# Patient Record
Sex: MALE | Race: WHITE | URBAN - METROPOLITAN AREA
[De-identification: names, ages, dates, MRNs, and addresses within clinical notes are randomized per-mention and may not be internally consistent; named-entity substitution may affect disease eponyms.]

---

## 2018-03-21 ENCOUNTER — HISTORICAL (OUTPATIENT)
Dept: LAB | Facility: HOSPITAL | Age: 78
End: 2018-03-21

## 2018-03-21 LAB
BUN SERPL-MCNC: 17 MG/DL (ref 7–18)
CALCIUM SERPL-MCNC: 9.2 MG/DL (ref 8.5–10.1)
CHLORIDE SERPL-SCNC: 100 MMOL/L (ref 98–107)
CO2 SERPL-SCNC: 28 MMOL/L (ref 21–32)
CREAT SERPL-MCNC: 1.51 MG/DL (ref 0.7–1.3)
CREAT/UREA NIT SERPL: 11.3
GLUCOSE SERPL-MCNC: 101 MG/DL (ref 74–106)
POTASSIUM SERPL-SCNC: 5.3 MMOL/L (ref 3.5–5.1)
PSA SERPL-MCNC: 0.06 NG/ML (ref 0–4)
SODIUM SERPL-SCNC: 132 MMOL/L (ref 136–145)

## 2019-03-19 ENCOUNTER — HISTORICAL (OUTPATIENT)
Dept: ADMINISTRATIVE | Facility: HOSPITAL | Age: 79
End: 2019-03-19

## 2019-03-19 LAB
FINAL CULTURE: NORMAL
FINAL CULTURE: NORMAL
GRAM STN SPEC: NORMAL

## 2019-03-21 LAB — FINAL CULTURE: NORMAL

## 2021-10-17 ENCOUNTER — HISTORICAL (OUTPATIENT)
Dept: ADMINISTRATIVE | Facility: HOSPITAL | Age: 81
End: 2021-10-17

## 2021-10-20 LAB — FINAL CULTURE: NORMAL

## 2021-10-22 ENCOUNTER — HISTORICAL (OUTPATIENT)
Dept: ADMINISTRATIVE | Facility: HOSPITAL | Age: 81
End: 2021-10-22

## 2021-10-28 LAB
FINAL CULTURE: NORMAL
FINAL CULTURE: NORMAL

## 2023-05-16 ENCOUNTER — HOSPITAL ENCOUNTER (OUTPATIENT)
Dept: TELEMEDICINE | Facility: HOSPITAL | Age: 83
Discharge: HOME | End: 2023-05-16
Payer: MEDICARE

## 2023-05-16 DIAGNOSIS — I63.81 CEREBROVASCULAR ACCIDENT (CVA) DUE TO OCCLUSION OF SMALL ARTERY: ICD-10-CM

## 2023-05-16 PROBLEM — I63.9 STROKE: Status: ACTIVE | Noted: 2023-05-16

## 2023-05-16 PROCEDURE — G0427 PR INPT TELEHEALTH CON 70/>M: ICD-10-PCS | Mod: 95,,, | Performed by: PSYCHIATRY & NEUROLOGY

## 2023-05-16 PROCEDURE — G0427 INPT/ED TELECONSULT70: HCPCS | Mod: 95,,, | Performed by: PSYCHIATRY & NEUROLOGY

## 2023-05-17 NOTE — CONSULTS
[facility]  Vascular Neurology  Comprehensive Stroke Center  TeleVascular Neurology Acute Consultation Note      Consults    Consulting Provider: NATALEE FORRESTER  Current Providers  No providers found    Patient Location: [facility] Department  Spoke hospital nurse at bedside with patient assisting consultant.     Patient information was obtained from patient and spouse/SO.         Assessment/Plan:       Diagnoses:   Neuro  Stroke  Fluctuating facial droop and left sided weakness over one week.   Antithrombotics for secondary stroke prevention: Antiplatelets: Aspirin: 81 mg daily    Statins for secondary stroke prevention and hyperlipidemia, if present:   Statins: Atorvastatin- 80 mg daily    Aggressive risk factor modification: HTN     Rehab efforts: The patient has been evaluated by a stroke team provider and the therapy needs have been fully considered based off the presenting complaints and exam findings. The following therapy evaluations are needed: PT evaluate and treat, OT evaluate and treat, SLP evaluate and treat    Diagnostics ordered/pending: CTA Head to assess vasculature , CTA Neck/Arch to assess vasculature, HgbA1C to assess blood glucose levels, Lipid Profile to assess cholesterol levels, MRI head without contrast to assess brain parenchyma, TTE to assess cardiac function/status , TSH to assess thyroid function    VTE prophylaxis: Enoxaparin 40 mg SQ every 24 hours    BP parameters: Infarct: No intervention, SBP <220            STROKE DOCUMENTATION     Acute Stroke Times:   Acute Stroke Times   Last Known Normal Date: 05/16/23  Last Known Normal Time: 1730 (Fluctuating symptoms with persistent facial droop since 530pm. )  Unknown Normal Time: Unknown Time  Stroke Team Arrival Date: 05/16/23  Stroke Team Arrival Time: 2037  CT Interpretation Time: 2038    NIH Scale:  Interval: baseline  1a. Level of  Consciousness: 0-->Alert, keenly responsive  1b. LOC Questions: 1-->Answers one question correctly  1c. LOC Commands: 0-->Performs both tasks correctly  2. Best Gaze: 0-->Normal  3. Visual: 0-->No visual loss  4. Facial Palsy: 1-->Minor paralysis (flattened nasolabial fold, asymmetry on smiling)  5a. Motor Arm, Left: 1-->Drift, limb holds 90 (or 45) degrees, but drifts down before full 10 seconds, does not hit bed or other support  5b. Motor Arm, Right: 0-->No drift, limb holds 90 (or 45) degrees for full 10 secs  6a. Motor Leg, Left: 0-->No drift, leg holds 30 degree position for full 5 secs  6b. Motor Leg, Right: 0-->No drift, leg holds 30 degree position for full 5 secs  7. Limb Ataxia: 0-->Absent  8. Sensory: 0-->Normal, no sensory loss  9. Best Language: 0-->No aphasia, normal  10. Dysarthria: 0-->Normal  11. Extinction and Inattention (formerly Neglect): 0-->No abnormality  Total (NIH Stroke Scale): 3     Modified Wingate Score: 0  Nahun Coma Scale:14   ABCD2 Score:    [id number] Score:   HAS -BLED Score:   ICH Score:   Hunt & Riddle Classification:       There were no vitals taken for this visit.  Eligible for thrombolytic therapy?: No  Thrombolytic therapy recomended: Thrombolytic therapy not recommended due to Outside of treatment window   Possible Interventional Revascularization Candidate? No; at this time symptoms not suggestive of large vessel occlusion    Disposition Recommendation: admit to inpatient    Subjective:     History of Present Illness:  Sudden onset left facial droop, slurred speech and left UE numbness/weakness    LKN 530pm    PMHx  HTN  PVD      Woke up with symptoms?: no    Recent bleeding noted: no  Does the patient take any Blood Thinners? yes  Medications: Antiplatelets:  aspirin      Past Medical History: hypertension    Past Surgical History: no relevant surgical history    Family History: no relevant history    Social History: former smoker    Allergies:  PCN    Review of Systems    Neurological: Positive for facial asymmetry and weakness.   All other systems reviewed and are negative.    Objective:   Vitals:  BP: 196/61, Respiratory Rate: 14 and Heart Rate: 74    CT READ: Yes  No hemmorhage. No mass effect. No early infarct signs.     Physical Exam  Constitutional:       Appearance: Normal appearance. He is normal weight.   HENT:      Head: Normocephalic and atraumatic.   Eyes:      Extraocular Movements: Extraocular movements intact.      Pupils: Pupils are equal, round, and reactive to light.   Pulmonary:      Effort: Pulmonary effort is normal.   Neurological:      Mental Status: He is alert. He is disoriented.      Cranial Nerves: Cranial nerve deficit present.      Motor: Weakness present.             Recommended the emergency room physician to have a brief discussion with the patient and/or family if available regarding the  risks and benefits of treatment, and to briefly document the occurrence of that discussion in his clinical encounter note.     The attending portion of this evaluation, treatment, and documentation was performed per Uri De Jesus MD via audiovisual.    Billing code:  (CT [facility] INPT CONSULT 35MIN)    In your opinion, this was a: Tier 2 Van Negative    Consult End Time: 8:52 PM     Uri De Jesus MD  Comprehensive Stroke Center  Vascular Neurology   [facility]

## 2023-05-17 NOTE — HPI
Sudden onset left facial droop, slurred speech and left UE numbness/weakness    LKN 530pm    PMHx  HTN  PVD

## 2023-05-17 NOTE — ASSESSMENT & PLAN NOTE
Fluctuating facial droop and left sided weakness over one week.   Antithrombotics for secondary stroke prevention: Antiplatelets: Aspirin: 81 mg daily    Statins for secondary stroke prevention and hyperlipidemia, if present:   Statins: Atorvastatin- 80 mg daily    Aggressive risk factor modification: HTN     Rehab efforts: The patient has been evaluated by a stroke team provider and the therapy needs have been fully considered based off the presenting complaints and exam findings. The following therapy evaluations are needed: PT evaluate and treat, OT evaluate and treat, SLP evaluate and treat    Diagnostics ordered/pending: CTA Head to assess vasculature , CTA Neck/Arch to assess vasculature, HgbA1C to assess blood glucose levels, Lipid Profile to assess cholesterol levels, MRI head without contrast to assess brain parenchyma, TTE to assess cardiac function/status , TSH to assess thyroid function    VTE prophylaxis: Enoxaparin 40 mg SQ every 24 hours    BP parameters: Infarct: No intervention, SBP <220

## 2023-05-17 NOTE — SUBJECTIVE & OBJECTIVE
Woke up with symptoms?: no    Recent bleeding noted: no  Does the patient take any Blood Thinners? yes  Medications: Antiplatelets:  aspirin      Past Medical History: hypertension    Past Surgical History: no relevant surgical history    Family History: no relevant history    Social History: former smoker    Allergies:  PCN    Review of Systems   Neurological: Positive for facial asymmetry and weakness.   All other systems reviewed and are negative.    Objective:   Vitals:  BP: 196/61, Respiratory Rate: 14 and Heart Rate: 74    CT READ: Yes  No hemmorhage. No mass effect. No early infarct signs.     Physical Exam  Constitutional:       Appearance: Normal appearance. He is normal weight.   HENT:      Head: Normocephalic and atraumatic.   Eyes:      Extraocular Movements: Extraocular movements intact.      Pupils: Pupils are equal, round, and reactive to light.   Pulmonary:      Effort: Pulmonary effort is normal.   Neurological:      Mental Status: He is alert. He is disoriented.      Cranial Nerves: Cranial nerve deficit present.      Motor: Weakness present.

## 2023-08-21 PROBLEM — I63.9 STROKE: Status: RESOLVED | Noted: 2023-05-16 | Resolved: 2023-08-21
